# Patient Record
(demographics unavailable — no encounter records)

---

## 2024-10-15 NOTE — REVIEW OF SYSTEMS
[Recent Weight Loss (___ Lbs)] : recent weight loss: [unfilled] lbs [All other systems negative] : All other systems negative [FreeTextEntry9] : left leg numbness, shoulder pain, and left arm pain

## 2024-10-15 NOTE — HISTORY OF PRESENT ILLNESS
[FreeTextEntry1] : CC: Diabetes This is a 68-year-old female with type 2 diabetes mellitus, osteopenia, hypertension, hypothyroidism, hyperlipidemia, obesity, hypertriglyceridemia, GERD, restless leg syndrome, here for a follow-up.  Diabetes diagnosed approximately age 58.  She is currently on metformin  mg 2 tablets twice a day, Mounjaro 15 mg weekly, and Jardiance 25 mg daily. She discontinued Levemir on her own a few months ago. Reports she has been losing weight since discontinuing Levemir. Also reports her glucose levels have been similar to when she was taking Levemir.  She has a Freestyle Eris CGM however has not been using it regularly and will insert it today. Will download in 1 week. Last ophthalmology exam was May 2024. Denies retinopathy. Denies neuropathy. Denies nephropathy.  She was on rosuvastatin in the past but discontinued it due to restless leg syndrome.  She is on irbesartan 300 mg daily.   Thyroid ultrasound from May 2024 small and heterogenous thyroid gland, no discrete thyroid nodule.   She is on Synthroid 150 mcg daily. Reports hair loss with levothyroxine.  DEXA from May 2024 showed spine t-score 2.4, femoral neck t-score -2.3, FRAX major 10%, hip 1.6%.  Denies fragility fracture in the past. Reports had a fracture of her patella after a fall.

## 2024-10-15 NOTE — ASSESSMENT
[FreeTextEntry1] : This is a 68-year-old female with type 2 diabetes mellitus, hypertension, hypothyroidism, hyperlipidemia, obesity, hypertriglyceridemia, GERD, restless leg syndrome, here for a follow-up.  1. T2DM She is currently on metformin  mg 2 tablets twice a day, Mounjaro 15 mg weekly, and Jardiance 25 mg daily. She discontinued Levemir on her own a few months ago. Reports she has been losing weight since discontinuing Levemir. Also reports her glucose levels have been similar to when she was taking Levemir.  She has a Freestyle Eris CGM however has not been using it regularly and will insert it today. Will download in 1 week. Check hbA1c Check vitamin B12 as she is on metformin. Last ophthalmology exam was less than 1 year ago. Denies retinopathy Denies neuropathy. Denies nephropathy. Check urine microalbumin.  She was on rosuvastatin in the past but discontinued it due to restless leg syndrome. Check lipid panel. She is on irbesartan 300 mg daily.  2. Primary hypothyroidism  Thyroid ultrasound from May 2024 showed small and heterogenous thyroid gland, no discrete thyroid nodule.  She is on Synthroid 150 mcg daily. Reports hair loss with levothyroxine. Check TFTs. 3. DEXA from May 2024 showed spine t-score 2.4, femoral neck t-score -2.3,  FRAX major 10%, hip 1.6%. No prescription medication needed at this time.  Denies fragility fracture in the past. Reports that she had a fracture of her patella after a fall.  Advised to take calcium 87103 mg daily thorough diet and supplements and light weight bearing exercises.

## 2024-10-15 NOTE — PHYSICAL EXAM
[Alert] : alert [Well Nourished] : well nourished [Healthy Appearance] : healthy appearance [Obese] : obese [No Acute Distress] : no acute distress [Well Developed] : well developed [Normal Voice/Communication] : normal voice communication [Normal Sclera/Conjunctiva] : normal sclera/conjunctiva [No Proptosis] : no proptosis [No Neck Mass] : no neck mass was observed [No LAD] : no lymphadenopathy [Supple] : the neck was supple [Thyroid Not Enlarged] : the thyroid was not enlarged [No Thyroid Nodules] : no palpable thyroid nodules [No Respiratory Distress] : no respiratory distress [Right foot was examined, including] : right foot ~C was examined, including visual inspection with sensory and pulse exams [Left foot was examined, including] : left foot ~C was examined, including visual inspection with sensory and pulse exams [Normal] : normal [Normal Affect] : the affect was normal [Normal Insight/Judgement] : insight and judgment were intact [Normal Mood] : the mood was normal [Diminished Throughout Both Feet] : normal tactile sensation with monofilament testing throughout both feet

## 2024-10-15 NOTE — ADDENDUM
[FreeTextEntry1] :  By signing my name below, I, Maribell Mcduffie, attest that this document has been prepared under the direction and in the presence of Dr. Irwin.  I, Jensen Irwin MD, personally performed the services described in this documentation. All medical record entries made by the scribe were at my discretion and in my presence. I have reviewed the chart and discharge instructions (if applicable) and agree that the record reflects my personal permanence and is accurate and complete.

## 2025-01-08 NOTE — DISCUSSION/SUMMARY
[FreeTextEntry1] : IMPRESSION: Mrs. Arshad is a 69 year old woman with a history of HTN, dyslipidemia, Diabetes mellitus, hypothyroidism, coronary atherosclerosis, carotid atherosclerosis, PAD, asthma, prior COVID infection, and former tobacco use who presents today for follow up of HTN and cholesterol.  PLAN: 1. Her most recent lipid profile was abnormal. She has not been taking Vascepa on a regular basis. She has been taking Fenofibrate 145 mg daily, and Crestor 10 mg every other day. I will be checking a lipid profile today.    2. Her blood pressure is good today. She states that she stopped taking HCTZ as her calcium level was high. She will continue on Amlodipine 10 mg daily, Metoprolol ER 50 mg twice daily, and Irbesartan 300 mg daily. She understands the importance of reducing her salt intake.   3. She will continue on ASA 81 mg daily given her coronary atherosclerosis and PAD. She has an abnormal baseline ECG with poor R wave progression and an inferior infarct that is relatively unchanged. She had a small area of ischemia on her nuclear stress test that was performed about a year and a half ago. I have asked her to schedule an echocardiogram given her abnormal EKG.  4. She will follow up with me in 3 months or sooner should she experience any symptoms in the interim.  [EKG obtained to assist in diagnosis and management of assessed problem(s)] : EKG obtained to assist in diagnosis and management of assessed problem(s)

## 2025-01-08 NOTE — HISTORY OF PRESENT ILLNESS
[FreeTextEntry1] : Patient is a 69 year old woman with a history of HTN, dyslipidemia, Diabetes mellitus, hypothyroidism, coronary atherosclerosis, carotid atherosclerosis, PAD, asthma, prior COVID infection, and former tobacco use who presents today for follow up of her blood pressure and cholesterol. She feels stressed as her  recently fell off a ladder. denies any claudication. She denies any dyspnea at rest, chest pain, palpitations, claudication, palpitations, or headaches. She hurt her knee in October and still has some knee discomfort.

## 2025-01-08 NOTE — PHYSICAL EXAM
[General Appearance - Well Developed] : well developed [Normal Appearance] : normal appearance [Well Groomed] : well groomed [General Appearance - Well Nourished] : well nourished [No Deformities] : no deformities [General Appearance - In No Acute Distress] : no acute distress [Normal Conjunctiva] : the conjunctiva exhibited no abnormalities [Normal Oral Mucosa] : normal oral mucosa [No Oral Pallor] : no oral pallor [No Oral Cyanosis] : no oral cyanosis [Normal Jugular Venous A Waves Present] : normal jugular venous A waves present [Normal Jugular Venous V Waves Present] : normal jugular venous V waves present [] : no respiratory distress [Respiration, Rhythm And Depth] : normal respiratory rhythm and effort [Exaggerated Use Of Accessory Muscles For Inspiration] : no accessory muscle use [Auscultation Breath Sounds / Voice Sounds] : lungs were clear to auscultation bilaterally [Bowel Sounds] : normal bowel sounds [Abdomen Soft] : soft [Abdomen Tenderness] : non-tender [Abnormal Walk] : normal gait [Gait - Sufficient For Exercise Testing] : the gait was sufficient for exercise testing [Nail Clubbing] : no clubbing of the fingernails [Cyanosis, Localized] : no localized cyanosis [Petechial Hemorrhages (___cm)] : no petechial hemorrhages [Oriented To Time, Place, And Person] : oriented to person, place, and time [Affect] : the affect was normal [Mood] : the mood was normal [No Anxiety] : not feeling anxious [Normal Rate] : normal [Rhythm Regular] : regular [Normal S1] : normal S1 [Normal S2] : normal S2 [I] : a grade 1 [No Pitting Edema] : no pitting edema present [FreeTextEntry1] : Extraocular muscles intact. Anicteric sclerae.  [S3] : no S3 [Right Carotid Bruit] : no bruit heard over the right carotid [Left Carotid Bruit] : no bruit heard over the left carotid [Bruit] : no bruit heard

## 2025-01-08 NOTE — CARDIOLOGY SUMMARY
[___] : [unfilled] [LVEF ___%] : LVEF [unfilled]% [None] : no pulmonary hypertension [de-identified] : 1/8/2025: Sinus Rhythm at 91 beats per minute with low voltage in precordial leads, old inferior infarct, and old anterior infarct [de-identified] :  Pharmacologic Nuclear stress test performed on 6/28/2023: No significant ischemic ST segment changes beyond baseline abnormalities. No arrhythmia associated with stress. There is a small-sized, mild defect of the inferoapical wall that is reversible and did not correct with prone imaging, consistent with a small area of ischemia.  LVEF= 75%, revealing overall preserved left ventricular ejection fraction. The blood pressure response was hypertensive. The heart rate response was normal.  [de-identified] :  6/28/2023: Endocardium not well visualized; grossly preserved left ventricular ejection fraction. EF is approximately 65%. Mild (grade 1) left ventricular diastolic dysfunction. Normal right ventricular cavity size and normal right ventricular systolic function. The left atrium is mildly dilated. Mild mitral regurgitation. Mitral annular calcification with thickened and calcified mitral valve leaflets. Calcified aortic valve with decreased opening. The aortic valve area is approximately 1.95 sqcm, by the continuity equation, which is consistent with mild aortic stenosis. Mild aortic regurgitation.  No echocardiographic evidence of pulmonary hypertension.  Trace tricuspid regurgitation.  PASP= 30 mmHg. Trace pericardial effusion. [de-identified] : Carotid Doppler performed on 12/7/2023: 1. Moderate calcified plaque in the right carotid bulb. Right ICA 20-49% stenosis. The distal right ICA is tortuous 2. Mild plaque in the left carotid bulb Left ICA 20-49% stenosis.   Carotid Doppler performed on 9/1/2021: Mild stenosis bilaterally. Mild progression from 2018.

## 2025-03-18 NOTE — PHYSICAL EXAM
[Alert] : alert [Well Nourished] : well nourished [Healthy Appearance] : healthy appearance [Obese] : obese [No Acute Distress] : no acute distress [Well Developed] : well developed [Normal Voice/Communication] : normal voice communication [Normal Sclera/Conjunctiva] : normal sclera/conjunctiva [No Proptosis] : no proptosis [No Neck Mass] : no neck mass was observed [No LAD] : no lymphadenopathy [Supple] : the neck was supple [Thyroid Not Enlarged] : the thyroid was not enlarged [No Thyroid Nodules] : no palpable thyroid nodules [No Respiratory Distress] : no respiratory distress [Right foot was examined, including] : right foot ~C was examined, including visual inspection with sensory and pulse exams [Left foot was examined, including] : left foot ~C was examined, including visual inspection with sensory and pulse exams [Normal] : normal [2+] : 2+ in the dorsalis pedis [Normal Sensation on Monofilament Testing] : normal sensation on monofilament testing of lower extremities [Normal Affect] : the affect was normal [Normal Insight/Judgement] : insight and judgment were intact [Normal Mood] : the mood was normal [Diminished Throughout Both Feet] : normal tactile sensation with monofilament testing throughout both feet

## 2025-03-18 NOTE — HISTORY OF PRESENT ILLNESS
[FreeTextEntry1] : CC: Diabetes This is a 69-year-old female with type 2 diabetes mellitus, osteopenia, hypertension, hypothyroidism, hyperlipidemia, obesity, hypertriglyceridemia, GERD, restless leg syndrome, here for a follow-up.  Diabetes diagnosed approximately age 58.  She is currently on Levemir 50 units approximately 5x weekly at nighttime, Novolog 10 units 4-5x weekly when glucose levels are >200, metformin  mg 2 tablets twice a day, Mounjaro 15 mg weekly, and Jardiance 25 mg daily. She SMBG occasionally and reports "glucose levels are greater than 90". When she is noncompliant with diet, glucose levels are > 200.  Last ophthalmology exam was May 2024. Denies retinopathy. Denies neuropathy. Denies nephropathy.  She is on rosuvastatin 10 mg at bedtime. Also taking vascepa 2000 mg 2 tablets daily. She is on irbesartan 300 mg daily.   Thyroid ultrasound from May 2024 small and heterogenous thyroid gland, no discrete thyroid nodule.   She is on Synthroid 150 mcg 6 days per week.   DEXA from May 2024 showed spine t-score 2.4, femoral neck t-score -2.3, FRAX major 10%, hip 1.6%.  Denies fragility fracture in the past. Reports had a fracture of her patella after a fall.

## 2025-03-18 NOTE — ADDENDUM
[FreeTextEntry1] :  By signing my name below, I, Maribell Mcduffie, attest that this document has been prepared under the direction and in the presence of Dr. Irwin.  I, Jensen Irwin MD, personally performed the services described in this documentation. All medical record entries made by the scribe were at my discretion and in my presence. I have reviewed the chart and discharge instructions (if applicable) and agree that the record reflects my personal permanence and is accurate and complete.  Per patient was not consistently wearing CGM due to a family member's wedding

## 2025-03-18 NOTE — ASSESSMENT
[FreeTextEntry1] : This is a 69-year-old female with type 2 diabetes mellitus, hypertension, hypothyroidism, hyperlipidemia, obesity, hypertriglyceridemia, GERD, restless leg syndrome, here for a follow-up.  1. T2DM Check hemoglobin A1c. She is currently on Levemir 50 units approximately 5x weekly at nighttime, Novolog 10 units 4-5x weekly when glucose levels are >200, metformin  mg 2 tablets twice a day, Mounjaro 15 mg weekly, and Jardiance 25 mg daily. Will see if Freestyle Eris CGM is covered. Will download in 2 weeks and make adjustments to insulin regimen at that time. Appointment with CDE.  Check vitamin B12 as she is on metformin. Last ophthalmology exam was less than 1 year ago. Denies retinopathy Denies neuropathy. Denies nephropathy. Check urine microalbumin.  She is on rosuvastatin 10 mg at bedtime. Also taking vascepa 2000 mg 2 tablets daily. Check lipid panel. She is on irbesartan 300 mg daily.  2. Primary hypothyroidism  Thyroid ultrasound from May 2024 showed small and heterogenous thyroid gland, no discrete thyroid nodule.  She is on Synthroid 150 mcg 6 days per week. Recent TSH from 01/2025 is 6.66, free T4 1.9. Check repeat TFTs.  3. DEXA from May 2024 showed spine t-score 2.4, femoral neck t-score -2.3,  FRAX major 10%, hip 1.6%. No prescription medication needed at this time.  Denies fragility fracture in the past. Reports that she had a fracture of her patella after a fall.  Advised to take calcium 1200 mg daily thorough diet and supplements and light weight bearing exercises.

## 2025-05-12 NOTE — HISTORY OF PRESENT ILLNESS
[FreeTextEntry1] : Patient is a 69 year old woman with a history of HTN, dyslipidemia, Diabetes mellitus, hypothyroidism, coronary atherosclerosis, carotid atherosclerosis, PAD, asthma, prior COVID infection, and former tobacco use who presents today for follow up of her blood pressure and cholesterol. She denies any dyspnea at rest, chest pain, palpitations, claudication, palpitations, or headaches. She hurt her knee in October and still has some knee discomfort, but improved. She states that she takes 2 grams of Vascepa at night.

## 2025-05-12 NOTE — CARDIOLOGY SUMMARY
[___] : [unfilled] [LVEF ___%] : LVEF [unfilled]% [None] : no pulmonary hypertension [de-identified] : 5/12/2025: Sinus Rhythm at 90 beats per minute with low voltage in precordial leads, old inferior infarct, and old anterior infarct [de-identified] :  Pharmacologic Nuclear stress test performed on 6/28/2023: No significant ischemic ST segment changes beyond baseline abnormalities. No arrhythmia associated with stress. There is a small-sized, mild defect of the inferoapical wall that is reversible and did not correct with prone imaging, consistent with a small area of ischemia.  LVEF= 75%, revealing overall preserved left ventricular ejection fraction. The blood pressure response was hypertensive. The heart rate response was normal.  [de-identified] : 2/26/2025:  Technically difficult image quality. Endocardium not well-visualized; grossly preserved left ventricular ejection fraction. EF is approximately 70%. Mild concentric left ventricular hypertrophy. There is a prominent septal bulge measuring approximately 1.8 cm. There is mild (grade 1) left ventricular diastolic dysfunction. Normal right ventricular cavity size and normal right ventricular systolic function. Mild mitral regurgitation. Mild mitral valve stenosis. Mitral annular calcification with thickened and calcified mitral valve leaflets and mildly decreased opening. No echocardiographic evidence of pulmonary hypertension. Estimated pulmonary artery systolic pressure is 26 mmHg. Trace tricuspid regurgitation. Trace aortic regurgitation. Trileaflet aortic valve with normal systolic excursion. There is calcification of the aortic valve leaflets. Hypermobile interatrial septum. Cannot rule out the presence of a patent foramen ovale, by color flow Doppler.   6/28/2023: Endocardium not well visualized; grossly preserved left ventricular ejection fraction. EF is approximately 65%. Mild (grade 1) left ventricular diastolic dysfunction. Normal right ventricular cavity size and normal right ventricular systolic function. The left atrium is mildly dilated. Mild mitral regurgitation. Mitral annular calcification with thickened and calcified mitral valve leaflets. Calcified aortic valve with decreased opening. The aortic valve area is approximately 1.95 sqcm, by the continuity equation, which is consistent with mild aortic stenosis. Mild aortic regurgitation.  No echocardiographic evidence of pulmonary hypertension.  Trace tricuspid regurgitation.  PASP= 30 mmHg. Trace pericardial effusion. [de-identified] : Carotid Doppler performed on 12/7/2023: 1. Moderate calcified plaque in the right carotid bulb. Right ICA 20-49% stenosis. The distal right ICA is tortuous 2. Mild plaque in the left carotid bulb Left ICA 20-49% stenosis.   Carotid Doppler performed on 9/1/2021: Mild stenosis bilaterally. Mild progression from 2018.

## 2025-05-12 NOTE — DISCUSSION/SUMMARY
[EKG obtained to assist in diagnosis and management of assessed problem(s)] : EKG obtained to assist in diagnosis and management of assessed problem(s) [FreeTextEntry1] : IMPRESSION: Mrs. Arshad is a 69 year old woman with a history of HTN, dyslipidemia, Diabetes mellitus, hypothyroidism, coronary atherosclerosis, carotid atherosclerosis, PAD, asthma, prior COVID infection, and former tobacco use who presents today for follow up of HTN and cholesterol.  PLAN: 1. Her most recent lipid profile was abnormal, however, improved. She has been taking Vascepa 2 grams at night. She has been taking Fenofibrate 145 mg daily, and Crestor 10 mg daily.     2. Her blood pressure is good today. She states that she stopped taking HCTZ as her calcium level was high. She will continue on Amlodipine 10 mg daily, Metoprolol ER 50 mg twice daily, and Irbesartan 300 mg daily. She understands the importance of reducing her salt intake.   3. She will continue on ASA 81 mg daily given her coronary atherosclerosis and PAD. She has an abnormal baseline ECG with poor R wave progression and an inferior infarct that is relatively unchanged. She had a small area of ischemia on her nuclear stress test that was performed a little less than 2 years ago. I have asked her to schedule a Cardiac CT given her abnormal nuclear stress test. 4. She will follow up with me in 4 months or sooner should she experience any symptoms in the interim.

## 2025-07-06 NOTE — HISTORY OF PRESENT ILLNESS
[FreeTextEntry1] : Patient is a 69 year old woman with a history of HTN, dyslipidemia, Diabetes mellitus, hypothyroidism, coronary atherosclerosis, carotid atherosclerosis, PAD, asthma, prior COVID infection, and former tobacco use who presents today for follow up of coronary artery disease, HTN, and cholesterol. She denies any dyspnea at rest, chest pain, palpitations, claudication, palpitations, or headaches. She states that she takes 2 grams of Vascepa at night.

## 2025-07-06 NOTE — CARDIOLOGY SUMMARY
[___] : [unfilled] [LVEF ___%] : LVEF [unfilled]% [None] : no pulmonary hypertension [de-identified] : 6/30/2025: Sinus Rhythm at 91 beats per minute with low voltage in precordial leads, old inferior infarct, and old anterior infarct [de-identified] :  Pharmacologic Nuclear stress test performed on 6/28/2023: No significant ischemic ST segment changes beyond baseline abnormalities. No arrhythmia associated with stress. There is a small-sized, mild defect of the inferoapical wall that is reversible and did not correct with prone imaging, consistent with a small area of ischemia.  LVEF= 75%, revealing overall preserved left ventricular ejection fraction. The blood pressure response was hypertensive. The heart rate response was normal.  [de-identified] : 2/26/2025:  Technically difficult image quality. Endocardium not well-visualized; grossly preserved left ventricular ejection fraction. EF is approximately 70%. Mild concentric left ventricular hypertrophy. There is a prominent septal bulge measuring approximately 1.8 cm. There is mild (grade 1) left ventricular diastolic dysfunction. Normal right ventricular cavity size and normal right ventricular systolic function. Mild mitral regurgitation. Mild mitral valve stenosis. Mitral annular calcification with thickened and calcified mitral valve leaflets and mildly decreased opening. No echocardiographic evidence of pulmonary hypertension. Estimated pulmonary artery systolic pressure is 26 mmHg. Trace tricuspid regurgitation. Trace aortic regurgitation. Trileaflet aortic valve with normal systolic excursion. There is calcification of the aortic valve leaflets. Hypermobile interatrial septum. Cannot rule out the presence of a patent foramen ovale, by color flow Doppler.   6/28/2023: Endocardium not well visualized; grossly preserved left ventricular ejection fraction. EF is approximately 65%. Mild (grade 1) left ventricular diastolic dysfunction. Normal right ventricular cavity size and normal right ventricular systolic function. The left atrium is mildly dilated. Mild mitral regurgitation. Mitral annular calcification with thickened and calcified mitral valve leaflets. Calcified aortic valve with decreased opening. The aortic valve area is approximately 1.95 sqcm, by the continuity equation, which is consistent with mild aortic stenosis. Mild aortic regurgitation.  No echocardiographic evidence of pulmonary hypertension.  Trace tricuspid regurgitation.  PASP= 30 mmHg. Trace pericardial effusion. [de-identified] : Cardiac CT performed on 6/03/2025: Severely elevated calcium score at 2254. LM: Calcified plaque causing 25-50% stenosis. LAD: Calcified and mixed plaques within [id number] segments causing severe stenosis. LCx:  The proximal LCx has calcified plaque causing mild stenosis. The distal LCx and obtuse marginal branch are not evaluated. RCA: Not evaluated secondary to motion. There is occlusion of the proximal segment of a small septal  branch. The LV wall thickness is increased measuring up to 15 mm of the basal anteroseptum.   [de-identified] : Cardiac catheterization performed on 6/19/2025:   Left main artery: There is a 10 % stenosis.   Mid left anterior descending: There is a 40 % stenosis.   Left Cx: Angiography shows mild atherosclerosis.   Mid right coronary artery: There is a 40 % stenosis.   [de-identified] : Carotid Doppler performed on 12/7/2023: 1. Moderate calcified plaque in the right carotid bulb. Right ICA 20-49% stenosis. The distal right ICA is tortuous 2. Mild plaque in the left carotid bulb Left ICA 20-49% stenosis.   Carotid Doppler performed on 9/1/2021: Mild stenosis bilaterally. Mild progression from 2018.

## 2025-07-06 NOTE — DISCUSSION/SUMMARY
[EKG obtained to assist in diagnosis and management of assessed problem(s)] : EKG obtained to assist in diagnosis and management of assessed problem(s) [FreeTextEntry1] : IMPRESSION: Mrs. Arshad is a 69 year old woman with a history of HTN, dyslipidemia, Diabetes mellitus, hypothyroidism, coronary atherosclerosis, carotid atherosclerosis, PAD, asthma, prior COVID infection, and former tobacco use who presents today for follow up of coronary artery disease, HTN and cholesterol.  PLAN: 1. Her most recent lipid profile was abnormal, however, slightly improved. She has been taking Vascepa 2 grams at night, Fenofibrate 145 mg daily, and Crestor 10 mg daily. She will go to the lab fasting for a CMP and a lipid profile.      2. Her blood pressure is very good today. She states that she stopped taking HCTZ as her calcium level was high. She will continue on Amlodipine 10 mg daily, Metoprolol ER 50 mg twice daily, and Irbesartan 300 mg daily. She understands the importance of reducing her salt intake.   3. She will continue on ASA 81 mg daily given her coronary atherosclerosis and PAD. She has an abnormal baseline ECG with an old inferior infarct and an old anterior infarct that is relatively unchanged. She had a left heart cath performed 2 weeks ago that did not reveal any obstriuctive CAD.  4. She will follow up with me in 3 months or sooner should she experience any symptoms in the interim.

## 2025-07-06 NOTE — CARDIOLOGY SUMMARY
[___] : [unfilled] [LVEF ___%] : LVEF [unfilled]% [None] : no pulmonary hypertension [de-identified] : 6/30/2025: Sinus Rhythm at 91 beats per minute with low voltage in precordial leads, old inferior infarct, and old anterior infarct [de-identified] :  Pharmacologic Nuclear stress test performed on 6/28/2023: No significant ischemic ST segment changes beyond baseline abnormalities. No arrhythmia associated with stress. There is a small-sized, mild defect of the inferoapical wall that is reversible and did not correct with prone imaging, consistent with a small area of ischemia.  LVEF= 75%, revealing overall preserved left ventricular ejection fraction. The blood pressure response was hypertensive. The heart rate response was normal.  [de-identified] : 2/26/2025:  Technically difficult image quality. Endocardium not well-visualized; grossly preserved left ventricular ejection fraction. EF is approximately 70%. Mild concentric left ventricular hypertrophy. There is a prominent septal bulge measuring approximately 1.8 cm. There is mild (grade 1) left ventricular diastolic dysfunction. Normal right ventricular cavity size and normal right ventricular systolic function. Mild mitral regurgitation. Mild mitral valve stenosis. Mitral annular calcification with thickened and calcified mitral valve leaflets and mildly decreased opening. No echocardiographic evidence of pulmonary hypertension. Estimated pulmonary artery systolic pressure is 26 mmHg. Trace tricuspid regurgitation. Trace aortic regurgitation. Trileaflet aortic valve with normal systolic excursion. There is calcification of the aortic valve leaflets. Hypermobile interatrial septum. Cannot rule out the presence of a patent foramen ovale, by color flow Doppler.   6/28/2023: Endocardium not well visualized; grossly preserved left ventricular ejection fraction. EF is approximately 65%. Mild (grade 1) left ventricular diastolic dysfunction. Normal right ventricular cavity size and normal right ventricular systolic function. The left atrium is mildly dilated. Mild mitral regurgitation. Mitral annular calcification with thickened and calcified mitral valve leaflets. Calcified aortic valve with decreased opening. The aortic valve area is approximately 1.95 sqcm, by the continuity equation, which is consistent with mild aortic stenosis. Mild aortic regurgitation.  No echocardiographic evidence of pulmonary hypertension.  Trace tricuspid regurgitation.  PASP= 30 mmHg. Trace pericardial effusion. [de-identified] : Cardiac catheterization performed on 6/19/2025:   Left main artery: There is a 10 % stenosis.   Mid left anterior descending: There is a 40 % stenosis.   Left Cx: Angiography shows mild atherosclerosis.   Mid right coronary artery: There is a 40 % stenosis.   [de-identified] : Cardiac CT performed on 6/03/2025: Severely elevated calcium score at 2254. LM: Calcified plaque causing 25-50% stenosis. LAD: Calcified and mixed plaques within [id number] segments causing severe stenosis. LCx:  The proximal LCx has calcified plaque causing mild stenosis. The distal LCx and obtuse marginal branch are not evaluated. RCA: Not evaluated secondary to motion. There is occlusion of the proximal segment of a small septal  branch. The LV wall thickness is increased measuring up to 15 mm of the basal anteroseptum.   [de-identified] : Carotid Doppler performed on 12/7/2023: 1. Moderate calcified plaque in the right carotid bulb. Right ICA 20-49% stenosis. The distal right ICA is tortuous 2. Mild plaque in the left carotid bulb Left ICA 20-49% stenosis.   Carotid Doppler performed on 9/1/2021: Mild stenosis bilaterally. Mild progression from 2018.

## 2025-07-06 NOTE — PHYSICAL EXAM
[General Appearance - Well Developed] : well developed [Normal Appearance] : normal appearance [Well Groomed] : well groomed [General Appearance - Well Nourished] : well nourished [No Deformities] : no deformities [General Appearance - In No Acute Distress] : no acute distress [Normal Conjunctiva] : the conjunctiva exhibited no abnormalities [Normal Oral Mucosa] : normal oral mucosa [No Oral Pallor] : no oral pallor [No Oral Cyanosis] : no oral cyanosis [Normal Jugular Venous A Waves Present] : normal jugular venous A waves present [Normal Jugular Venous V Waves Present] : normal jugular venous V waves present [] : no respiratory distress [Respiration, Rhythm And Depth] : normal respiratory rhythm and effort [Exaggerated Use Of Accessory Muscles For Inspiration] : no accessory muscle use [Auscultation Breath Sounds / Voice Sounds] : lungs were clear to auscultation bilaterally [Bowel Sounds] : normal bowel sounds [Abdomen Soft] : soft [Abdomen Tenderness] : non-tender [Abnormal Walk] : normal gait [Gait - Sufficient For Exercise Testing] : the gait was sufficient for exercise testing [Nail Clubbing] : no clubbing of the fingernails [Cyanosis, Localized] : no localized cyanosis [Petechial Hemorrhages (___cm)] : no petechial hemorrhages [Oriented To Time, Place, And Person] : oriented to person, place, and time [Affect] : the affect was normal [Mood] : the mood was normal [No Anxiety] : not feeling anxious [Normal Rate] : normal [Rhythm Regular] : regular [Normal S1] : normal S1 [Normal S2] : normal S2 [I] : a grade 1 [No Pitting Edema] : no pitting edema present [2+] : right 2+ [FreeTextEntry1] : Extraocular muscles intact. Anicteric sclerae.  [S3] : no S3 [Right Carotid Bruit] : no bruit heard over the right carotid [Left Carotid Bruit] : no bruit heard over the left carotid [Bruit] : no bruit heard

## 2025-07-15 NOTE — PHYSICAL EXAM
[2+] : left foot dorsalis pedis 2+ [Vibration Dec.] : diminished vibratory sensation at the level of the toes [Position Sense Dec.] : diminished position sense at the level of the toes [Diminished Throughout Right Foot] : diminished sensation with monofilament testing throughout right foot [Diminished Throughout Left Foot] : diminished sensation with monofilament testing throughout left foot [Ankle Swelling (On Exam)] : not present [Varicose Veins Of Lower Extremities] : not present [] : not present [Delayed in the Right Toes] : capillary refills normal in right toes [Delayed in the Left Toes] : capillary refills normal in the left toes [FreeTextEntry3] : Hair growth noted on digits. Proximal to distal cooling is within normal limits.  [de-identified] : Right hallux limitus. It is 50% limited in motion. It is painful with extreme dorsiflexion or plantar flexion or palpation at the dorsal 1st MPJ. [FreeTextEntry1] : Intrinsic muscular atrophy.

## 2025-07-15 NOTE — HISTORY OF PRESENT ILLNESS
[FreeTextEntry1] : Patient presents today complaining of pain at the big toe joint. She notices stiffness and sensitivity but only since she stubbed it a couple of days ago but she states it is feeling better. She does have peripheral neuropathy. Her A1c is 7.4. Fasting sugar is 164.

## 2025-07-15 NOTE — PHYSICAL EXAM
[2+] : left foot dorsalis pedis 2+ [Vibration Dec.] : diminished vibratory sensation at the level of the toes [Position Sense Dec.] : diminished position sense at the level of the toes [Diminished Throughout Right Foot] : diminished sensation with monofilament testing throughout right foot [Diminished Throughout Left Foot] : diminished sensation with monofilament testing throughout left foot [Ankle Swelling (On Exam)] : not present [Varicose Veins Of Lower Extremities] : not present [] : not present [Delayed in the Right Toes] : capillary refills normal in right toes [Delayed in the Left Toes] : capillary refills normal in the left toes [FreeTextEntry3] : Hair growth noted on digits. Proximal to distal cooling is within normal limits.  [de-identified] : Right hallux limitus. It is 50% limited in motion. It is painful with extreme dorsiflexion or plantar flexion or palpation at the dorsal 1st MPJ. [FreeTextEntry1] : Intrinsic muscular atrophy.

## 2025-07-15 NOTE — ASSESSMENT
[FreeTextEntry1] : Impression:  Hallux limitus. Pain. Diabetes with neuropathy.  Treatment: Regarding the diabetic neuropathy, I discussed that she is a fall risk and what to be careful about.  I want her to get nerve formula vitamin. I gave her some home exercises to work on. I discussed stability sneakers and living with this. Discussed the option of removal of a loose body and dorsal spur vs. 1st MPJ fusion. at this point I am recommending treating this conservatively. Shew ill follow-up for evaluation as needed.

## 2025-07-15 NOTE — PROCEDURE
[FreeTextEntry1] : X-ray Report: (Right foot - 3 views) X-rays demonstrate moderately arthritic right 1st MPJ with large dorsal spur and loose dorsal piece at the MPJ. There is non-uniform joint narrowing and subchondral sclerosis with significant arthritis.

## 2025-07-15 NOTE — PHYSICAL EXAM
[2+] : left foot dorsalis pedis 2+ [Vibration Dec.] : diminished vibratory sensation at the level of the toes [Position Sense Dec.] : diminished position sense at the level of the toes [Diminished Throughout Right Foot] : diminished sensation with monofilament testing throughout right foot [Diminished Throughout Left Foot] : diminished sensation with monofilament testing throughout left foot [Ankle Swelling (On Exam)] : not present [Varicose Veins Of Lower Extremities] : not present [] : not present [Delayed in the Right Toes] : capillary refills normal in right toes [Delayed in the Left Toes] : capillary refills normal in the left toes [FreeTextEntry3] : Hair growth noted on digits. Proximal to distal cooling is within normal limits.  [de-identified] : Right hallux limitus. It is 50% limited in motion. It is painful with extreme dorsiflexion or plantar flexion or palpation at the dorsal 1st MPJ. [FreeTextEntry1] : Intrinsic muscular atrophy.

## 2025-07-29 NOTE — PHYSICAL EXAM
[Alert] : alert [Well Nourished] : well nourished [Healthy Appearance] : healthy appearance [Obese] : obese [No Acute Distress] : no acute distress [Well Developed] : well developed [Normal Voice/Communication] : normal voice communication [Normal Sclera/Conjunctiva] : normal sclera/conjunctiva [No Proptosis] : no proptosis [No Neck Mass] : no neck mass was observed [No LAD] : no lymphadenopathy [Supple] : the neck was supple [Thyroid Not Enlarged] : the thyroid was not enlarged [No Thyroid Nodules] : no palpable thyroid nodules [No Respiratory Distress] : no respiratory distress [Right foot was examined, including] : right foot ~C was examined, including visual inspection with sensory and pulse exams [Left foot was examined, including] : left foot ~C was examined, including visual inspection with sensory and pulse exams [Normal] : normal [2+] : 2+ in the dorsalis pedis [Normal Sensation on Monofilament Testing] : normal sensation on monofilament testing of lower extremities [Normal Affect] : the affect was normal [Normal Insight/Judgement] : insight and judgment were intact [Normal Mood] : the mood was normal [Diminished Throughout Both Feet] : normal tactile sensation with monofilament testing throughout both feet [de-identified] : sees podiatry

## 2025-07-29 NOTE — PHYSICAL EXAM
[Alert] : alert [Well Nourished] : well nourished [Healthy Appearance] : healthy appearance [Obese] : obese [No Acute Distress] : no acute distress [Well Developed] : well developed [Normal Voice/Communication] : normal voice communication [Normal Sclera/Conjunctiva] : normal sclera/conjunctiva [No Proptosis] : no proptosis [No Neck Mass] : no neck mass was observed [No LAD] : no lymphadenopathy [Supple] : the neck was supple [Thyroid Not Enlarged] : the thyroid was not enlarged [No Thyroid Nodules] : no palpable thyroid nodules [No Respiratory Distress] : no respiratory distress [Right foot was examined, including] : right foot ~C was examined, including visual inspection with sensory and pulse exams [Left foot was examined, including] : left foot ~C was examined, including visual inspection with sensory and pulse exams [Normal] : normal [2+] : 2+ in the dorsalis pedis [Normal Sensation on Monofilament Testing] : normal sensation on monofilament testing of lower extremities [Normal Affect] : the affect was normal [Normal Insight/Judgement] : insight and judgment were intact [Normal Mood] : the mood was normal [Diminished Throughout Both Feet] : normal tactile sensation with monofilament testing throughout both feet [de-identified] : sees podiatry

## 2025-07-29 NOTE — ADDENDUM
[FreeTextEntry1] :  By signing my name below, I, Marta Hopson, attest that this document has been prepared under the direction and in the presence of Dr. Irwin.  I, Jensen Irwin MD, personally performed the services described in this documentation. All medical record entries made by the scribe were at my discretion and in my presence. I have reviewed the chart and discharge instructions (if applicable) and agree that the record reflects my personal permanence and is accurate and complete.

## 2025-07-29 NOTE — ASSESSMENT
[FreeTextEntry1] : This is a 69-year-old female with type 2 diabetes mellitus, hypertension, hypothyroidism, hyperlipidemia, obesity, hypertriglyceridemia, GERD, restless leg syndrome, here for a follow-up.  1. T2DM Check hemoglobin A1c. She is currently on Levemir 40 units a few times per month when glucose levels < 200  1-2x weekly ,Novolog 7 units with breakfast, and a few times weekly at lunch and dinner, when glucose levels are <200, metformin  mg 2 tablets twice a day, Mounjaro 15 mg weekly, and Jardiance 25 mg daily. Check vitamin B12 as she is on metformin. Last ophthalmology exam was June 2025.  Has Moderate non proliferative diabetic retinopathy.  Denies neuropathy. Denies nephropathy. Check urine microalbumin.  She is on rosuvastatin 10 mg at bedtime. Also taking vascepa 2000 mg 2 tablets daily. Check lipid panel. She is on irbesartan 300 mg daily.  2. Primary hypothyroidism  Thyroid ultrasound from May 2024 showed small and heterogenous thyroid gland, no discrete thyroid nodule.  She is on Synthroid 150 mcg 6 days per week. Recent TSH from 01/2025 is 6.66, free T4 1.9. Check repeat TFTs.  3. DEXA from May 2024 showed spine t-score 2.4, femoral neck t-score -2.3,  FRAX major 10%, hip 1.6%. No prescription medication needed at this time.  Denies fragility fracture in the past. Reports that she had a fracture of her patella after a fall.  Advised to take calcium 1200 mg daily thorough diet and supplements and light weight bearing exercises Check Dexa in May 2026 For hair loss check DHEAS and testosterone, although level maybe affected by finasteride

## 2025-07-29 NOTE — HISTORY OF PRESENT ILLNESS
[FreeTextEntry1] : CC: Diabetes This is a 69-year-old female with type 2 diabetes mellitus with retinopathy, osteopenia, hypertension, hypothyroidism, hyperlipidemia, hypercalcemia, obesity, hypertriglyceridemia, GERD, restless leg syndrome, here for a follow-up.  Diabetes diagnosed approximately age 58.  She is currently on Levemir 40 units a few times per month when glucose levels < 200 1-2x weekly, Novolog 7 units with breakfast, and a few times weekly at lunch and dinner, when glucose levels are <200, metformin  mg 2 tablets twice a day, Mounjaro 15 mg weekly, and Jardiance 25 mg daily. Last ophthalmology exam was June 2025.  Has Moderate non proliferative diabetic retinopathy.  Denies neuropathy. Denies nephropathy.  She is on rosuvastatin 10 mg at bedtime. Also taking vascepa 2000 mg 2 tablets daily. She is on irbesartan 300 mg daily.   Thyroid ultrasound from May 2024 showed small and heterogenous thyroid gland, no discrete thyroid nodule.   She is on Synthroid 150 mcg daily.   DEXA from May 2024 showed spine t-score 2.4, femoral neck t-score -2.3, FRAX major 10%, hip 1.6%.  Denies fragility fracture in the past. Reports had a fracture of her patella after a fall.   Reports she has been experiencing hair loss